# Patient Record
Sex: FEMALE | Race: WHITE | NOT HISPANIC OR LATINO
[De-identification: names, ages, dates, MRNs, and addresses within clinical notes are randomized per-mention and may not be internally consistent; named-entity substitution may affect disease eponyms.]

---

## 2019-12-11 PROBLEM — Z00.00 ENCOUNTER FOR PREVENTIVE HEALTH EXAMINATION: Status: ACTIVE | Noted: 2019-12-11

## 2019-12-13 ENCOUNTER — RESULT REVIEW (OUTPATIENT)
Age: 54
End: 2019-12-13

## 2019-12-13 ENCOUNTER — RECORD ABSTRACTING (OUTPATIENT)
Age: 54
End: 2019-12-13

## 2019-12-13 DIAGNOSIS — Z80.0 FAMILY HISTORY OF MALIGNANT NEOPLASM OF DIGESTIVE ORGANS: ICD-10-CM

## 2019-12-13 DIAGNOSIS — Z80.7 FAMILY HISTORY OF OTHER MALIGNANT NEOPLASMS OF LYMPHOID, HEMATOPOIETIC AND RELATED TISSUES: ICD-10-CM

## 2019-12-13 DIAGNOSIS — Z80.3 FAMILY HISTORY OF MALIGNANT NEOPLASM OF BREAST: ICD-10-CM

## 2019-12-13 LAB — CYTOLOGY CVX/VAG DOC THIN PREP: NORMAL

## 2019-12-17 ENCOUNTER — APPOINTMENT (OUTPATIENT)
Dept: OBGYN | Facility: CLINIC | Age: 54
End: 2019-12-17
Payer: COMMERCIAL

## 2019-12-17 VITALS
DIASTOLIC BLOOD PRESSURE: 76 MMHG | HEIGHT: 61 IN | BODY MASS INDEX: 28.32 KG/M2 | WEIGHT: 150 LBS | SYSTOLIC BLOOD PRESSURE: 114 MMHG

## 2019-12-17 DIAGNOSIS — Z12.31 ENCOUNTER FOR SCREENING MAMMOGRAM FOR MALIGNANT NEOPLASM OF BREAST: ICD-10-CM

## 2019-12-17 DIAGNOSIS — N95.0 POSTMENOPAUSAL BLEEDING: ICD-10-CM

## 2019-12-17 PROCEDURE — 99212 OFFICE O/P EST SF 10 MIN: CPT

## 2019-12-19 PROBLEM — N95.0 PMB (POSTMENOPAUSAL BLEEDING): Status: ACTIVE | Noted: 2019-12-11

## 2021-03-07 ENCOUNTER — TRANSCRIPTION ENCOUNTER (OUTPATIENT)
Age: 56
End: 2021-03-07

## 2021-04-12 ENCOUNTER — NON-APPOINTMENT (OUTPATIENT)
Age: 56
End: 2021-04-12

## 2021-04-12 ENCOUNTER — APPOINTMENT (OUTPATIENT)
Dept: INTERNAL MEDICINE | Facility: CLINIC | Age: 56
End: 2021-04-12
Payer: COMMERCIAL

## 2021-04-12 VITALS
WEIGHT: 161 LBS | OXYGEN SATURATION: 99 % | DIASTOLIC BLOOD PRESSURE: 80 MMHG | HEART RATE: 70 BPM | BODY MASS INDEX: 30.4 KG/M2 | HEIGHT: 61 IN | SYSTOLIC BLOOD PRESSURE: 118 MMHG | TEMPERATURE: 98.2 F

## 2021-04-12 DIAGNOSIS — Z76.89 PERSONS ENCOUNTERING HEALTH SERVICES IN OTHER SPECIFIED CIRCUMSTANCES: ICD-10-CM

## 2021-04-12 DIAGNOSIS — Z12.39 ENCOUNTER FOR OTHER SCREENING FOR MALIGNANT NEOPLASM OF BREAST: ICD-10-CM

## 2021-04-12 PROCEDURE — 99072 ADDL SUPL MATRL&STAF TM PHE: CPT

## 2021-04-12 PROCEDURE — 99203 OFFICE O/P NEW LOW 30 MIN: CPT | Mod: 25

## 2021-04-12 PROCEDURE — G0444 DEPRESSION SCREEN ANNUAL: CPT | Mod: 59

## 2021-04-12 NOTE — HEALTH RISK ASSESSMENT
[Very Good] : ~his/her~  mood as very good [0-5] : 0-5 [Yes] : Yes [2 - 3 times a week (3 pts)] : 2 - 3  times a week (3 points) [1 or 2 (0 pts)] : 1 or 2 (0 points) [Never (0 pts)] : Never (0 points) [No falls in past year] : Patient reported no falls in the past year [0] : 2) Feeling down, depressed, or hopeless: Not at all (0) [Patient reported mammogram was normal] : Patient reported mammogram was normal [Patient reported colonoscopy was normal] : Patient reported colonoscopy was normal [] : No [Audit-CScore] : 3 [de-identified] : daily walks  [de-identified] : more veggies than meats but eats a lot of sweets  [[id number]] : 0 [MammogramDate] : 08/19 [MammogramComments] : could of had one more recent but don't recall when  [ColonoscopyDate] : 01/12 [ColonoscopyComments] : 9 years ago

## 2021-04-12 NOTE — PHYSICAL EXAM
[No Acute Distress] : no acute distress [No Respiratory Distress] : no respiratory distress  [No Accessory Muscle Use] : no accessory muscle use [Clear to Auscultation] : lungs were clear to auscultation bilaterally [Normal Rate] : normal rate  [Regular Rhythm] : with a regular rhythm [Normal S1, S2] : normal S1 and S2 [No Murmur] : no murmur heard [No Carotid Bruits] : no carotid bruits [Pedal Pulses Present] : the pedal pulses are present [No Edema] : there was no peripheral edema [Normal Appearance] : normal in appearance [No Masses] : no palpable masses [No Nipple Discharge] : no nipple discharge [No Axillary Lymphadenopathy] : no axillary lymphadenopathy [Soft] : abdomen soft [Non Tender] : non-tender [Normal Bowel Sounds] : normal bowel sounds

## 2021-04-12 NOTE — HISTORY OF PRESENT ILLNESS
[FreeTextEntry1] : New Patient/here to establish Medical Care [de-identified] : 54 y/o here to establish Medical care.  She was followed by a Family Medicine Doctor and has decided to switch Doctors as her Children are not longer being followed by the Family Medicine Doctor.\par She reports a left side headache 1 month ago.  She had pain in Jaw with chewing.  She was seen by Dentist and Neurologist. MRI of head Pending.\par She needs a Mammogram.\par She will schedule an appointment for a her Annual Physical .\par

## 2021-04-12 NOTE — REVIEW OF SYSTEMS
[Headache] : headache [Fever] : no fever [Chills] : no chills [Chest Pain] : no chest pain [Palpitations] : no palpitations [Lower Ext Edema] : no lower extremity edema [Shortness Of Breath] : no shortness of breath [Cough] : no cough [Dizziness] : no dizziness [FreeTextEntry3] : last eye exam this year [de-identified] : has pain with chewing; question of Trigeminal Neuralgia

## 2021-05-26 ENCOUNTER — NON-APPOINTMENT (OUTPATIENT)
Age: 56
End: 2021-05-26

## 2021-05-26 ENCOUNTER — APPOINTMENT (OUTPATIENT)
Dept: INTERNAL MEDICINE | Facility: CLINIC | Age: 56
End: 2021-05-26
Payer: COMMERCIAL

## 2021-05-26 VITALS
HEIGHT: 61 IN | OXYGEN SATURATION: 99 % | BODY MASS INDEX: 29.45 KG/M2 | TEMPERATURE: 97.7 F | SYSTOLIC BLOOD PRESSURE: 116 MMHG | HEART RATE: 66 BPM | WEIGHT: 156 LBS | DIASTOLIC BLOOD PRESSURE: 80 MMHG

## 2021-05-26 DIAGNOSIS — E78.00 PURE HYPERCHOLESTEROLEMIA, UNSPECIFIED: ICD-10-CM

## 2021-05-26 DIAGNOSIS — G89.29 HEADACHE, UNSPECIFIED: ICD-10-CM

## 2021-05-26 DIAGNOSIS — R51.9 HEADACHE, UNSPECIFIED: ICD-10-CM

## 2021-05-26 DIAGNOSIS — Z12.11 ENCOUNTER FOR SCREENING FOR MALIGNANT NEOPLASM OF COLON: ICD-10-CM

## 2021-05-26 DIAGNOSIS — Z00.00 ENCOUNTER FOR GENERAL ADULT MEDICAL EXAMINATION W/OUT ABNORMAL FINDINGS: ICD-10-CM

## 2021-05-26 DIAGNOSIS — G93.9 DISORDER OF BRAIN, UNSPECIFIED: ICD-10-CM

## 2021-05-26 DIAGNOSIS — D22.9 MELANOCYTIC NEVI, UNSPECIFIED: ICD-10-CM

## 2021-05-26 PROCEDURE — 99072 ADDL SUPL MATRL&STAF TM PHE: CPT

## 2021-05-26 PROCEDURE — 93000 ELECTROCARDIOGRAM COMPLETE: CPT

## 2021-05-26 PROCEDURE — 99396 PREV VISIT EST AGE 40-64: CPT | Mod: 25

## 2021-05-26 PROCEDURE — 36415 COLL VENOUS BLD VENIPUNCTURE: CPT

## 2021-05-27 ENCOUNTER — NON-APPOINTMENT (OUTPATIENT)
Age: 56
End: 2021-05-27

## 2021-05-27 PROBLEM — E78.00 HYPERCHOLESTEROLEMIA: Status: ACTIVE | Noted: 2019-12-13

## 2021-05-27 PROBLEM — R51.9 CHRONIC LEFT-SIDED HEADACHES: Status: ACTIVE | Noted: 2021-05-27

## 2021-05-27 PROBLEM — G93.9 BRAIN LESION: Status: ACTIVE | Noted: 2021-05-27

## 2021-05-27 PROBLEM — Z00.00 ANNUAL PHYSICAL EXAM: Status: ACTIVE | Noted: 2021-05-26

## 2021-05-27 NOTE — HEALTH RISK ASSESSMENT
[Very Good] : ~his/her~  mood as very good [Yes] : Yes [2 - 3 times a week (3 pts)] : 2 - 3  times a week (3 points) [1 or 2 (0 pts)] : 1 or 2 (0 points) [Never (0 pts)] : Never (0 points) [No] : In the past 12 months have you used drugs other than those required for medical reasons? No [No falls in past year] : Patient reported no falls in the past year [0] : 2) Feeling down, depressed, or hopeless: Not at all (0) [Patient reported mammogram was normal] : Patient reported mammogram was normal [Patient reported PAP Smear was normal] : Patient reported PAP Smear was normal [HIV test declined] : HIV test declined [Hepatitis C test declined] : Hepatitis C test declined [] : No [de-identified] : neurologist May 2021 [Audit-CScore] : 3 [de-identified] : daily walks a couple miles a day  [de-identified] : normal diet  [[id number]] : 0 [MammogramDate] : 05/21 [PapSmearDate] : 08/18 [ColonoscopyDate] : 08/09

## 2021-05-27 NOTE — HISTORY OF PRESENT ILLNESS
[FreeTextEntry1] : Annual Physical [de-identified] : Patient is a 55 year-old lady here for her annual physical.  She has a history of hyperlipidemia and chronic left-sided headache.  Her Neurologist referred her to a Neurosurgeon. MRI with a 1.2 x 0.7 x 2.3 cm lesion in the prepontine fossa. She continues to have intermittent headaches.\par Mammogram negative.  She will schedule appointment with her GYN MD.

## 2021-05-27 NOTE — PHYSICAL EXAM
[No Acute Distress] : no acute distress [Well-Appearing] : well-appearing [Normal Sclera/Conjunctiva] : normal sclera/conjunctiva [EOMI] : extraocular movements intact [Normal Outer Ear/Nose] : the outer ears and nose were normal in appearance [No JVD] : no jugular venous distention [Supple] : supple [No Lymphadenopathy] : no lymphadenopathy [No Respiratory Distress] : no respiratory distress  [No Accessory Muscle Use] : no accessory muscle use [Clear to Auscultation] : lungs were clear to auscultation bilaterally [Normal Rate] : normal rate  [Regular Rhythm] : with a regular rhythm [Normal S1, S2] : normal S1 and S2 [No Murmur] : no murmur heard [No Carotid Bruits] : no carotid bruits [Pedal Pulses Present] : the pedal pulses are present [No Edema] : there was no peripheral edema [Soft] : abdomen soft [Non Tender] : non-tender [Normal Bowel Sounds] : normal bowel sounds [Normal Supraclavicular Nodes] : no supraclavicular lymphadenopathy [Normal Axillary Nodes] : no axillary lymphadenopathy [Normal Posterior Cervical Nodes] : no posterior cervical lymphadenopathy [Normal Anterior Cervical Nodes] : no anterior cervical lymphadenopathy [No CVA Tenderness] : no CVA  tenderness [No Spinal Tenderness] : no spinal tenderness [No Joint Swelling] : no joint swelling [Grossly Normal Strength/Tone] : grossly normal strength/tone [No Rash] : no rash [Coordination Grossly Intact] : coordination grossly intact [No Focal Deficits] : no focal deficits [Normal Gait] : normal gait [Deep Tendon Reflexes (DTR)] : deep tendon reflexes were 2+ and symmetric [Speech Grossly Normal] : speech grossly normal [Normal Affect] : the affect was normal [Alert and Oriented x3] : oriented to person, place, and time [Normal Mood] : the mood was normal

## 2021-05-27 NOTE — REVIEW OF SYSTEMS
[Headache] : headache [Fever] : no fever [Chills] : no chills [Fatigue] : no fatigue [Sore Throat] : no sore throat [Postnasal Drip] : no postnasal drip [Chest Pain] : no chest pain [Palpitations] : no palpitations [Lower Ext Edema] : no lower extremity edema [Shortness Of Breath] : no shortness of breath [Cough] : no cough [Abdominal Pain] : no abdominal pain [Nausea] : no nausea [Constipation] : no constipation [Diarrhea] : diarrhea [Vomiting] : no vomiting [Melena] : no melena [Dysuria] : no dysuria [Vaginal Discharge] : no vaginal discharge [Joint Pain] : no joint pain [Back Pain] : no back pain [Dizziness] : no dizziness [Suicidal] : not suicidal [Depression] : no depression [Easy Bleeding] : no easy bleeding [Swollen Glands] : no swollen glands [FreeTextEntry3] : last eye exam this year [de-identified] : left sided; MRI with a prepontine fossa lesion

## 2021-06-08 ENCOUNTER — APPOINTMENT (OUTPATIENT)
Dept: NEUROSURGERY | Facility: CLINIC | Age: 56
End: 2021-06-08
Payer: COMMERCIAL

## 2021-06-08 ENCOUNTER — TRANSCRIPTION ENCOUNTER (OUTPATIENT)
Age: 56
End: 2021-06-08

## 2021-06-08 PROCEDURE — 99205 OFFICE O/P NEW HI 60 MIN: CPT

## 2021-06-08 PROCEDURE — 99072 ADDL SUPL MATRL&STAF TM PHE: CPT

## 2021-06-08 NOTE — PHYSICAL EXAM
[General Appearance - Alert] : alert [General Appearance - In No Acute Distress] : in no acute distress [General Appearance - Well Nourished] : well nourished [General Appearance - Well Developed] : well developed [Oriented To Time, Place, And Person] : oriented to person, place, and time [Cranial Nerves Optic (II)] : visual acuity intact bilaterally,  pupils equal round and reactive to light [Cranial Nerves Oculomotor (III)] : extraocular motion intact [Cranial Nerves Trigeminal (V)] : facial sensation intact symmetrically [Cranial Nerves Facial (VII)] : face symmetrical [Cranial Nerves Vestibulocochlear (VIII)] : hearing was intact bilaterally [Cranial Nerves Glossopharyngeal (IX)] : tongue and palate midline [Cranial Nerves Accessory (XI - Cranial And Spinal)] : head turning and shoulder shrug symmetric [Motor Tone] : muscle tone was normal in all four extremities [Cranial Nerves Hypoglossal (XII)] : there was no tongue deviation with protrusion [Motor Strength] : muscle strength was normal in all four extremities [Sensation Tactile Decrease] : light touch was intact [Abnormal Walk] : normal gait [Balance] : balance was intact

## 2021-06-08 NOTE — HISTORY OF PRESENT ILLNESS
[FreeTextEntry1] : left facial pain [de-identified] : 55 year old female with a past medical history of hyperlipidemia presents in neurosurgical consultation at the request of Dr. Yesenia Lemons. She endorses a history of two paroxysmal episodes of left sided facial and head pain.  Described as pulsating, around the left temple and left mandibular region.  Dental evaluation was normal, and she was subsequently referred to Dr. Lemons.  An MRI was performed which revealed a non-enhancing prepontine cystic mass lesion, discussed below.\par \par Presently, Ms. Carlton denies any residual headache or facial pain. Denies extremity numbness/weakness.  Denies visual disturbances.

## 2021-06-08 NOTE — ASSESSMENT
[FreeTextEntry1] : 55yf with history of episodic atypical left sided facial pain and imaging demonstrating a nonenhancing cystic lesion in the prepontine cistern eccentric to the right. There does not appear to be anatomic proximity to the left trigeminal nerve, and therefore I feel this lesion in incidental and not etiologic for her atypical facial pain. There is no change in size or morphology over a one month time span and the patient is currently asymptomatic. 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 Neurosurgical intervention is not recommended at this time. I plan to obtain a CT with thin cuts through the skull base to evaluate the relationship of the cyst to the clivus and to serve as a baseline for possible future imaging. I plan to repeat the MRI brain with and without gadolinium in 6 months to ensure stability. I have asked the patient to contact my office for any symptomatic development or progression in the interim at which time we can obtain expedited follow up imaging.

## 2021-06-08 NOTE — DATA REVIEWED
[de-identified] : 4/28/21 (without stephy), 5/24/21 (with and without stephy) reviewed independently by me demonstrate a non-enhancing cystic lesion in the prepontine cistern most consistent with arachnoid cyst versus ecchordosis physaliphora\par 4/28/21: Approximately 1 cm cystic lesion seen in the right prepontine cistern that may \par represent a small arachnoid cyst; if there is further clinical concern would recommend MRI with \par contrast. \par \par  Normal course of the lateral nerve VII and VIII complexes. Normal course of the bilateral \par trigeminal nerves. \par \par  Mild to moderate cerebellar atrophy; findings may be due to epilepsy medication or alcohol use. \par \par  Nonspecific single small ovoid foci of T2 and Flair signal hyperintensity within the left anterior \par external capsule ; findings may be seen in patient with migraine headaches, vasculitis, \par demyelinating disease, Lyme disease and viral illness. No evidence of acute infarction. \par 5/24/21: No abnormal enhancement and benign imaging characteristics relating to the circumscribed lesion \par following CSF signal on all sequences within the prepontine cistern. Leading diagnostic consideratio\par ns include arachnoid cyst or Ecchordosis physaliphora, stable in size and morphology between the 2 scans.

## 2021-06-11 ENCOUNTER — APPOINTMENT (OUTPATIENT)
Dept: INTERNAL MEDICINE | Facility: CLINIC | Age: 56
End: 2021-06-11

## 2021-07-07 LAB
ALBUMIN SERPL ELPH-MCNC: 4.5 G/DL
ALP BLD-CCNC: 84 U/L
ALT SERPL-CCNC: 22 U/L
ANION GAP SERPL CALC-SCNC: 12 MMOL/L
APPEARANCE: CLEAR
AST SERPL-CCNC: 28 U/L
BACTERIA: NEGATIVE
BASOPHILS # BLD AUTO: 0.02 K/UL
BASOPHILS NFR BLD AUTO: 0.5 %
BILIRUB SERPL-MCNC: 0.5 MG/DL
BILIRUBIN URINE: NEGATIVE
BLOOD URINE: ABNORMAL
BUN SERPL-MCNC: 11 MG/DL
CALCIUM SERPL-MCNC: 9.9 MG/DL
CHLORIDE SERPL-SCNC: 101 MMOL/L
CHOLEST SERPL-MCNC: 243 MG/DL
CO2 SERPL-SCNC: 27 MMOL/L
COLOR: YELLOW
CREAT SERPL-MCNC: 0.66 MG/DL
EOSINOPHIL # BLD AUTO: 0.17 K/UL
EOSINOPHIL NFR BLD AUTO: 3.9 %
GLUCOSE QUALITATIVE U: NEGATIVE
GLUCOSE SERPL-MCNC: 88 MG/DL
HCT VFR BLD CALC: 40.9 %
HDLC SERPL-MCNC: 102 MG/DL
HGB BLD-MCNC: 12.8 G/DL
HYALINE CASTS: 1 /LPF
IMM GRANULOCYTES NFR BLD AUTO: 0.2 %
KETONES URINE: NEGATIVE
LDLC SERPL CALC-MCNC: 131 MG/DL
LEUKOCYTE ESTERASE URINE: NEGATIVE
LYMPHOCYTES # BLD AUTO: 1.69 K/UL
LYMPHOCYTES NFR BLD AUTO: 38.9 %
MAN DIFF?: NORMAL
MCHC RBC-ENTMCNC: 29.1 PG
MCHC RBC-ENTMCNC: 31.3 GM/DL
MCV RBC AUTO: 93 FL
MICROSCOPIC-UA: NORMAL
MONOCYTES # BLD AUTO: 0.34 K/UL
MONOCYTES NFR BLD AUTO: 7.8 %
NEUTROPHILS # BLD AUTO: 2.11 K/UL
NEUTROPHILS NFR BLD AUTO: 48.7 %
NITRITE URINE: NEGATIVE
NONHDLC SERPL-MCNC: 141 MG/DL
PH URINE: 5.5
PLATELET # BLD AUTO: 279 K/UL
POTASSIUM SERPL-SCNC: 4.3 MMOL/L
PROT SERPL-MCNC: 7.1 G/DL
PROTEIN URINE: NEGATIVE
RBC # BLD: 4.4 M/UL
RBC # FLD: 12.2 %
RED BLOOD CELLS URINE: 5 /HPF
SODIUM SERPL-SCNC: 140 MMOL/L
SPECIFIC GRAVITY URINE: 1.02
SQUAMOUS EPITHELIAL CELLS: 1 /HPF
TRIGL SERPL-MCNC: 50 MG/DL
TSH SERPL-ACNC: 1.08 UIU/ML
UROBILINOGEN URINE: NORMAL
WBC # FLD AUTO: 4.34 K/UL
WHITE BLOOD CELLS URINE: 0 /HPF

## 2021-08-24 ENCOUNTER — TRANSCRIPTION ENCOUNTER (OUTPATIENT)
Age: 56
End: 2021-08-24

## 2021-12-21 ENCOUNTER — APPOINTMENT (OUTPATIENT)
Dept: NEUROSURGERY | Facility: CLINIC | Age: 56
End: 2021-12-21

## 2022-02-11 ENCOUNTER — TRANSCRIPTION ENCOUNTER (OUTPATIENT)
Age: 57
End: 2022-02-11

## 2022-03-22 ENCOUNTER — TRANSCRIPTION ENCOUNTER (OUTPATIENT)
Age: 57
End: 2022-03-22

## 2023-05-22 ENCOUNTER — LABORATORY RESULT (OUTPATIENT)
Age: 58
End: 2023-05-22

## 2023-05-22 ENCOUNTER — APPOINTMENT (OUTPATIENT)
Dept: INTERNAL MEDICINE | Facility: CLINIC | Age: 58
End: 2023-05-22
Payer: COMMERCIAL

## 2023-05-22 VITALS
HEIGHT: 61 IN | SYSTOLIC BLOOD PRESSURE: 110 MMHG | OXYGEN SATURATION: 100 % | DIASTOLIC BLOOD PRESSURE: 70 MMHG | BODY MASS INDEX: 30.4 KG/M2 | HEART RATE: 65 BPM | WEIGHT: 161 LBS

## 2023-05-22 PROCEDURE — 99214 OFFICE O/P EST MOD 30 MIN: CPT

## 2023-06-05 LAB
B BURGDOR DNA SPEC QL NAA+PROBE: NEGATIVE
ERYTHROCYTE [SEDIMENTATION RATE] IN BLOOD BY WESTERGREN METHOD: 28 MM/HR
T4 FREE SERPL-MCNC: 1.1 NG/DL
TSH SERPL-ACNC: 1.31 UIU/ML

## 2023-06-10 ENCOUNTER — APPOINTMENT (OUTPATIENT)
Dept: INTERNAL MEDICINE | Facility: CLINIC | Age: 58
End: 2023-06-10

## 2023-06-16 ENCOUNTER — TRANSCRIPTION ENCOUNTER (OUTPATIENT)
Age: 58
End: 2023-06-16

## 2023-06-19 ENCOUNTER — TRANSCRIPTION ENCOUNTER (OUTPATIENT)
Age: 58
End: 2023-06-19

## 2023-06-20 ENCOUNTER — APPOINTMENT (OUTPATIENT)
Dept: INTERNAL MEDICINE | Facility: CLINIC | Age: 58
End: 2023-06-20
Payer: COMMERCIAL

## 2023-06-20 VITALS
DIASTOLIC BLOOD PRESSURE: 74 MMHG | SYSTOLIC BLOOD PRESSURE: 110 MMHG | OXYGEN SATURATION: 98 % | BODY MASS INDEX: 30.4 KG/M2 | HEIGHT: 61 IN | TEMPERATURE: 97.3 F | RESPIRATION RATE: 16 BRPM | HEART RATE: 78 BPM | WEIGHT: 161 LBS

## 2023-06-20 DIAGNOSIS — R29.898 OTHER SYMPTOMS AND SIGNS INVOLVING THE MUSCULOSKELETAL SYSTEM: ICD-10-CM

## 2023-06-20 PROCEDURE — 36415 COLL VENOUS BLD VENIPUNCTURE: CPT

## 2023-06-20 PROCEDURE — 99214 OFFICE O/P EST MOD 30 MIN: CPT | Mod: 25

## 2023-06-21 ENCOUNTER — APPOINTMENT (OUTPATIENT)
Dept: RHEUMATOLOGY | Facility: CLINIC | Age: 58
End: 2023-06-21
Payer: COMMERCIAL

## 2023-06-21 VITALS
BODY MASS INDEX: 30.4 KG/M2 | HEIGHT: 61 IN | OXYGEN SATURATION: 98 % | WEIGHT: 161 LBS | HEART RATE: 78 BPM | SYSTOLIC BLOOD PRESSURE: 110 MMHG | DIASTOLIC BLOOD PRESSURE: 80 MMHG

## 2023-06-21 DIAGNOSIS — M54.2 CERVICALGIA: ICD-10-CM

## 2023-06-21 PROBLEM — R29.898 WEAKNESS OF DISTAL ARMS AND LEGS: Status: ACTIVE | Noted: 2023-05-22

## 2023-06-21 PROCEDURE — 99205 OFFICE O/P NEW HI 60 MIN: CPT

## 2023-06-21 NOTE — PHYSICAL EXAM
[General Appearance - Alert] : alert [General Appearance - In No Acute Distress] : in no acute distress [General Appearance - Well Nourished] : well nourished [General Appearance - Well Developed] : well developed [Sclera] : the sclera and conjunctiva were normal [Auscultation Breath Sounds / Voice Sounds] : lungs were clear to auscultation bilaterally [Cervical Lymph Nodes Enlarged Posterior Bilaterally] : posterior cervical [Cervical Lymph Nodes Enlarged Anterior Bilaterally] : anterior cervical [] : no rash [Motor Exam] : the motor exam was normal [FreeTextEntry1] : There is minimal discomfort on palpation of GH joints bilaterally. No proximal weakness. There is reduced ROM of the hips without pain on extreme ROM. There is no other active synovitis throughout all other joints examined.

## 2023-06-21 NOTE — REVIEW OF SYSTEMS
[Fever] : no fever [Chills] : no chills [Eye Pain] : no eye pain [Red Eyes] : eyes not red [Shortness Of Breath] : no shortness of breath [SOB on Exertion] : no shortness of breath during exertion [Abdominal Pain] : no abdominal pain [Diarrhea] : no diarrhea [Dysuria] : no dysuria [Joint Swelling] : no joint swelling [Skin Lesions] : no skin lesions [FreeTextEntry6] : No pleuritic C/P

## 2023-06-21 NOTE — HISTORY OF PRESENT ILLNESS
[FreeTextEntry1] : Patient reports having developed progressive shoulder girdle pain and stiffness in late April with iu=inability to raise her arms above her head. Then later developed hip girdle pain and stiffness. Was seen by PCP who found a rising ESR and was sent for evaluation. There has been no weight loss, fever, HAs or claudication. there has been no rash or skin photosensitivity, serositis and no ocular, GI or  symptoms. There have been no small joint symptoms in the hands or feet. There is profound fatigue closely associated with joint symptoms.

## 2023-06-22 DIAGNOSIS — R76.8 OTHER SPECIFIED ABNORMAL IMMUNOLOGICAL FINDINGS IN SERUM: ICD-10-CM

## 2023-06-22 LAB
CCP AB SER IA-ACNC: 66 UNITS
RF+CCP IGG SER-IMP: ABNORMAL

## 2023-06-23 LAB
ENA SS-A AB SER IA-ACNC: 0.5 AL
ENA SS-B AB SER IA-ACNC: <0.2 AL

## 2023-06-26 LAB
ALBUMIN SERPL ELPH-MCNC: 4.5 G/DL
ALP BLD-CCNC: 109 U/L
ALT SERPL-CCNC: 30 U/L
ANA SER IF-ACNC: NEGATIVE
ANION GAP SERPL CALC-SCNC: 16 MMOL/L
AST SERPL-CCNC: 37 U/L
BILIRUB SERPL-MCNC: 0.4 MG/DL
BUN SERPL-MCNC: 15 MG/DL
CALCIUM SERPL-MCNC: 9.5 MG/DL
CHLORIDE SERPL-SCNC: 102 MMOL/L
CK SERPL-CCNC: 85 U/L
CO2 SERPL-SCNC: 24 MMOL/L
CREAT SERPL-MCNC: 0.54 MG/DL
CRP SERPL-MCNC: 4 MG/L
DSDNA AB SER-ACNC: <12 IU/ML
EGFR: 108 ML/MIN/1.73M2
ERYTHROCYTE [SEDIMENTATION RATE] IN BLOOD BY WESTERGREN METHOD: 64 MM/HR
GLUCOSE SERPL-MCNC: 112 MG/DL
POTASSIUM SERPL-SCNC: 4.1 MMOL/L
PROT SERPL-MCNC: 7.4 G/DL
RHEUMATOID FACT SER QL: 10 IU/ML
SODIUM SERPL-SCNC: 141 MMOL/L

## 2023-06-27 LAB — HISTONE AB SER QL: 0.8 UNITS

## 2023-06-28 ENCOUNTER — APPOINTMENT (OUTPATIENT)
Dept: RHEUMATOLOGY | Facility: CLINIC | Age: 58
End: 2023-06-28
Payer: COMMERCIAL

## 2023-06-28 VITALS
HEART RATE: 71 BPM | SYSTOLIC BLOOD PRESSURE: 120 MMHG | WEIGHT: 161 LBS | HEIGHT: 61 IN | OXYGEN SATURATION: 98 % | DIASTOLIC BLOOD PRESSURE: 70 MMHG | BODY MASS INDEX: 30.4 KG/M2

## 2023-06-28 DIAGNOSIS — Z79.899 OTHER LONG TERM (CURRENT) DRUG THERAPY: ICD-10-CM

## 2023-06-28 PROCEDURE — 99215 OFFICE O/P EST HI 40 MIN: CPT

## 2023-06-28 NOTE — REVIEW OF SYSTEMS
[Joint Pain] : joint pain [Joint Stiffness] : joint stiffness [Fever] : no fever [Chills] : no chills [Eye Pain] : no eye pain [Red Eyes] : eyes not red [Shortness Of Breath] : no shortness of breath [SOB on Exertion] : no shortness of breath during exertion [Abdominal Pain] : no abdominal pain [Diarrhea] : no diarrhea [Dysuria] : no dysuria [Joint Swelling] : no joint swelling [Skin Lesions] : no skin lesions [FreeTextEntry6] : No pleuritic C/P

## 2023-06-28 NOTE — PHYSICAL EXAM
[General Appearance - Alert] : alert [General Appearance - In No Acute Distress] : in no acute distress [General Appearance - Well Nourished] : well nourished [General Appearance - Well Developed] : well developed [] : no rash [Motor Exam] : the motor exam was normal [FreeTextEntry1] : There is no other active synovitis throughout all joints examined.

## 2023-06-28 NOTE — HISTORY OF PRESENT ILLNESS
[FreeTextEntry1] : Patient sustained only partial benefit with prednisone 10 mg - VAS reduced from 10/10 to 5/10.

## 2023-06-29 LAB
HBV SURFACE AG SER QL: NONREACTIVE
HCV AB SER QL: NONREACTIVE
HCV S/CO RATIO: 0.1 S/CO

## 2023-07-03 LAB
M TB IFN-G BLD-IMP: NEGATIVE
QUANTIFERON TB PLUS MITOGEN MINUS NIL: 1.4 IU/ML
QUANTIFERON TB PLUS NIL: 0.02 IU/ML
QUANTIFERON TB PLUS TB1 MINUS NIL: -0.01 IU/ML
QUANTIFERON TB PLUS TB2 MINUS NIL: -0.01 IU/ML

## 2023-07-28 ENCOUNTER — APPOINTMENT (OUTPATIENT)
Dept: INTERNAL MEDICINE | Facility: CLINIC | Age: 58
End: 2023-07-28
Payer: COMMERCIAL

## 2023-07-28 PROCEDURE — 36415 COLL VENOUS BLD VENIPUNCTURE: CPT

## 2023-08-28 ENCOUNTER — LABORATORY RESULT (OUTPATIENT)
Age: 58
End: 2023-08-28

## 2023-08-28 ENCOUNTER — APPOINTMENT (OUTPATIENT)
Dept: RHEUMATOLOGY | Facility: CLINIC | Age: 58
End: 2023-08-28
Payer: COMMERCIAL

## 2023-08-28 PROCEDURE — 36415 COLL VENOUS BLD VENIPUNCTURE: CPT

## 2023-09-01 ENCOUNTER — APPOINTMENT (OUTPATIENT)
Dept: INTERNAL MEDICINE | Facility: CLINIC | Age: 58
End: 2023-09-01

## 2023-09-12 ENCOUNTER — APPOINTMENT (OUTPATIENT)
Dept: RHEUMATOLOGY | Facility: CLINIC | Age: 58
End: 2023-09-12
Payer: COMMERCIAL

## 2023-09-12 VITALS
HEART RATE: 70 BPM | DIASTOLIC BLOOD PRESSURE: 70 MMHG | BODY MASS INDEX: 31.41 KG/M2 | SYSTOLIC BLOOD PRESSURE: 110 MMHG | WEIGHT: 160 LBS | OXYGEN SATURATION: 97 % | HEIGHT: 60 IN

## 2023-09-12 PROCEDURE — 99215 OFFICE O/P EST HI 40 MIN: CPT

## 2023-09-12 RX ORDER — LEFLUNOMIDE 20 MG/1
20 TABLET, FILM COATED ORAL
Qty: 90 | Refills: 1 | Status: DISCONTINUED | COMMUNITY
Start: 2023-06-28 | End: 2023-09-12

## 2023-09-13 LAB
ALBUMIN SERPL ELPH-MCNC: 4.8 G/DL
ALP BLD-CCNC: 78 U/L
ALT SERPL-CCNC: 22 U/L
AST SERPL-CCNC: 26 U/L
BILIRUB DIRECT SERPL-MCNC: 0.1 MG/DL
BILIRUB INDIRECT SERPL-MCNC: 0.2 MG/DL
BILIRUB SERPL-MCNC: 0.4 MG/DL
PROT SERPL-MCNC: 7.5 G/DL

## 2023-10-02 ENCOUNTER — APPOINTMENT (OUTPATIENT)
Dept: RHEUMATOLOGY | Facility: CLINIC | Age: 58
End: 2023-10-02

## 2023-11-11 ENCOUNTER — RX RENEWAL (OUTPATIENT)
Age: 58
End: 2023-11-11

## 2023-11-27 ENCOUNTER — RX RENEWAL (OUTPATIENT)
Age: 58
End: 2023-11-27

## 2023-11-27 RX ORDER — PREDNISONE 5 MG/1
5 TABLET ORAL DAILY
Qty: 30 | Refills: 1 | Status: ACTIVE | COMMUNITY
Start: 2023-08-31 | End: 1900-01-01

## 2023-12-11 ENCOUNTER — APPOINTMENT (OUTPATIENT)
Dept: RHEUMATOLOGY | Facility: CLINIC | Age: 58
End: 2023-12-11
Payer: COMMERCIAL

## 2023-12-11 VITALS
DIASTOLIC BLOOD PRESSURE: 78 MMHG | BODY MASS INDEX: 31.41 KG/M2 | HEIGHT: 60 IN | WEIGHT: 160 LBS | OXYGEN SATURATION: 98 % | HEART RATE: 76 BPM | SYSTOLIC BLOOD PRESSURE: 122 MMHG

## 2023-12-11 PROCEDURE — 99214 OFFICE O/P EST MOD 30 MIN: CPT

## 2023-12-11 RX ORDER — PREDNISONE 1 MG/1
1 TABLET ORAL
Qty: 100 | Refills: 0 | Status: DISCONTINUED | COMMUNITY
Start: 2023-06-21 | End: 2023-12-11

## 2024-03-05 ENCOUNTER — RX RENEWAL (OUTPATIENT)
Age: 59
End: 2024-03-05

## 2024-05-23 ENCOUNTER — APPOINTMENT (OUTPATIENT)
Dept: RHEUMATOLOGY | Facility: CLINIC | Age: 59
End: 2024-05-23
Payer: COMMERCIAL

## 2024-05-23 VITALS
BODY MASS INDEX: 30.63 KG/M2 | OXYGEN SATURATION: 98 % | DIASTOLIC BLOOD PRESSURE: 64 MMHG | WEIGHT: 156 LBS | SYSTOLIC BLOOD PRESSURE: 112 MMHG | HEIGHT: 60 IN | HEART RATE: 77 BPM

## 2024-05-23 DIAGNOSIS — M06.09 RHEUMATOID ARTHRITIS W/OUT RHEUMATOID FACTOR, MULTIPLE SITES: ICD-10-CM

## 2024-05-23 PROCEDURE — G2211 COMPLEX E/M VISIT ADD ON: CPT

## 2024-05-23 PROCEDURE — 99214 OFFICE O/P EST MOD 30 MIN: CPT

## 2024-05-23 RX ORDER — HYDROXYCHLOROQUINE SULFATE 200 MG/1
200 TABLET, FILM COATED ORAL
Qty: 270 | Refills: 1 | Status: ACTIVE | COMMUNITY
Start: 2023-09-12 | End: 1900-01-01

## 2024-05-23 NOTE — PHYSICAL EXAM
[General Appearance - Alert] : alert [General Appearance - In No Acute Distress] : in no acute distress [General Appearance - Well Developed] : well developed [General Appearance - Well Nourished] : well nourished [] : no rash [Motor Exam] : the motor exam was normal [FreeTextEntry1] : There is no active synovitis throughout all joints examined.

## 2024-05-23 NOTE — HISTORY OF PRESENT ILLNESS
[FreeTextEntry1] : Patient reports was able to taper off prednisone in December without flare until about one month ago developed increased joint stiffness and increased fatigue. Taking Plaquenil as Rx'ed - has not yet seen ophtho (has had previous visits with ophtho).

## 2024-10-02 ENCOUNTER — APPOINTMENT (OUTPATIENT)
Dept: RHEUMATOLOGY | Facility: CLINIC | Age: 59
End: 2024-10-02
Payer: COMMERCIAL

## 2024-10-02 VITALS
SYSTOLIC BLOOD PRESSURE: 118 MMHG | OXYGEN SATURATION: 98 % | DIASTOLIC BLOOD PRESSURE: 70 MMHG | HEIGHT: 60 IN | HEART RATE: 73 BPM | WEIGHT: 157 LBS | BODY MASS INDEX: 30.82 KG/M2

## 2024-10-02 DIAGNOSIS — M06.09 RHEUMATOID ARTHRITIS W/OUT RHEUMATOID FACTOR, MULTIPLE SITES: ICD-10-CM

## 2024-10-02 PROCEDURE — 99214 OFFICE O/P EST MOD 30 MIN: CPT

## 2024-10-02 PROCEDURE — G2211 COMPLEX E/M VISIT ADD ON: CPT | Mod: NC

## 2024-10-02 RX ORDER — PREDNISONE 20 MG/1
20 TABLET ORAL
Qty: 5 | Refills: 0 | Status: COMPLETED | COMMUNITY
Start: 2024-10-02 | End: 2024-10-09

## 2024-10-02 NOTE — PHYSICAL EXAM
[General Appearance - Alert] : alert [General Appearance - In No Acute Distress] : in no acute distress [General Appearance - Well Nourished] : well nourished [General Appearance - Well Developed] : well developed [] : no rash [Motor Exam] : the motor exam was normal [FreeTextEntry1] : There is minimal pain on extreme ROM of the left hip.

## 2024-10-02 NOTE — END OF VISIT
[Time Spent: ___ minutes] : I have spent [unfilled] minutes of time on the encounter which excludes teaching and separately reported services.
Yes

## 2024-10-02 NOTE — HISTORY OF PRESENT ILLNESS
[FreeTextEntry1] : Patient reports she did feel better while on Plaquenil 600 mg daily, and after reducing the dose  she notes in the last month increased thigh pain and stiffness.

## 2024-10-25 ENCOUNTER — APPOINTMENT (OUTPATIENT)
Dept: RHEUMATOLOGY | Facility: CLINIC | Age: 59
End: 2024-10-25
Payer: COMMERCIAL

## 2024-10-25 VITALS
HEIGHT: 60 IN | SYSTOLIC BLOOD PRESSURE: 102 MMHG | DIASTOLIC BLOOD PRESSURE: 70 MMHG | WEIGHT: 157 LBS | BODY MASS INDEX: 30.82 KG/M2

## 2024-10-25 DIAGNOSIS — M06.09 RHEUMATOID ARTHRITIS W/OUT RHEUMATOID FACTOR, MULTIPLE SITES: ICD-10-CM

## 2024-10-25 PROCEDURE — G2211 COMPLEX E/M VISIT ADD ON: CPT | Mod: NC

## 2024-10-25 PROCEDURE — 99214 OFFICE O/P EST MOD 30 MIN: CPT

## 2024-10-25 RX ORDER — SULFASALAZINE 500 MG/1
500 TABLET, DELAYED RELEASE ORAL
Qty: 360 | Refills: 1 | Status: ACTIVE | COMMUNITY
Start: 2024-10-25 | End: 1900-01-01

## 2024-11-25 ENCOUNTER — APPOINTMENT (OUTPATIENT)
Dept: RHEUMATOLOGY | Facility: CLINIC | Age: 59
End: 2024-11-25
Payer: COMMERCIAL

## 2024-11-25 PROCEDURE — 36415 COLL VENOUS BLD VENIPUNCTURE: CPT

## 2024-11-26 LAB
ALBUMIN SERPL ELPH-MCNC: 4.3 G/DL
ALP BLD-CCNC: 98 U/L
ALT SERPL-CCNC: 36 U/L
ANION GAP SERPL CALC-SCNC: 12 MMOL/L
AST SERPL-CCNC: 36 U/L
BASOPHILS # BLD AUTO: 0.03 K/UL
BASOPHILS NFR BLD AUTO: 0.7 %
BILIRUB SERPL-MCNC: 0.3 MG/DL
BUN SERPL-MCNC: 10 MG/DL
CALCIUM SERPL-MCNC: 9.6 MG/DL
CHLORIDE SERPL-SCNC: 102 MMOL/L
CO2 SERPL-SCNC: 29 MMOL/L
CREAT SERPL-MCNC: 0.69 MG/DL
EGFR: 101 ML/MIN/1.73M2
EOSINOPHIL # BLD AUTO: 0.09 K/UL
EOSINOPHIL NFR BLD AUTO: 2.2 %
GLUCOSE SERPL-MCNC: 80 MG/DL
HCT VFR BLD CALC: 41.6 %
HGB BLD-MCNC: 13.2 G/DL
IMM GRANULOCYTES NFR BLD AUTO: 0 %
LYMPHOCYTES # BLD AUTO: 1.15 K/UL
LYMPHOCYTES NFR BLD AUTO: 28.3 %
MAN DIFF?: NORMAL
MCHC RBC-ENTMCNC: 29.9 PG
MCHC RBC-ENTMCNC: 31.7 G/DL
MCV RBC AUTO: 94.3 FL
MONOCYTES # BLD AUTO: 0.35 K/UL
MONOCYTES NFR BLD AUTO: 8.6 %
NEUTROPHILS # BLD AUTO: 2.44 K/UL
NEUTROPHILS NFR BLD AUTO: 60.2 %
PLATELET # BLD AUTO: 286 K/UL
POTASSIUM SERPL-SCNC: 4.3 MMOL/L
PROT SERPL-MCNC: 6.9 G/DL
RBC # BLD: 4.41 M/UL
RBC # FLD: 13.1 %
SODIUM SERPL-SCNC: 143 MMOL/L
WBC # FLD AUTO: 4.06 K/UL

## 2025-01-30 ENCOUNTER — APPOINTMENT (OUTPATIENT)
Dept: RHEUMATOLOGY | Facility: CLINIC | Age: 60
End: 2025-01-30
Payer: COMMERCIAL

## 2025-01-30 VITALS
HEIGHT: 60 IN | OXYGEN SATURATION: 98 % | HEART RATE: 75 BPM | BODY MASS INDEX: 30.43 KG/M2 | SYSTOLIC BLOOD PRESSURE: 100 MMHG | WEIGHT: 155 LBS | DIASTOLIC BLOOD PRESSURE: 70 MMHG

## 2025-01-30 DIAGNOSIS — M06.09 RHEUMATOID ARTHRITIS W/OUT RHEUMATOID FACTOR, MULTIPLE SITES: ICD-10-CM

## 2025-01-30 PROCEDURE — G2211 COMPLEX E/M VISIT ADD ON: CPT | Mod: NC

## 2025-01-30 PROCEDURE — 99214 OFFICE O/P EST MOD 30 MIN: CPT

## 2025-01-31 LAB
ALBUMIN SERPL ELPH-MCNC: 4.2 G/DL
ALP BLD-CCNC: 75 U/L
ALT SERPL-CCNC: 16 U/L
ANION GAP SERPL CALC-SCNC: 10 MMOL/L
AST SERPL-CCNC: 27 U/L
BASOPHILS # BLD AUTO: 0.03 K/UL
BASOPHILS NFR BLD AUTO: 0.6 %
BILIRUB SERPL-MCNC: 0.2 MG/DL
BUN SERPL-MCNC: 11 MG/DL
CALCIUM SERPL-MCNC: 9.3 MG/DL
CHLORIDE SERPL-SCNC: 101 MMOL/L
CO2 SERPL-SCNC: 27 MMOL/L
CREAT SERPL-MCNC: 0.77 MG/DL
EGFR: 89 ML/MIN/1.73M2
EOSINOPHIL # BLD AUTO: 0.05 K/UL
EOSINOPHIL NFR BLD AUTO: 1 %
GLUCOSE SERPL-MCNC: 79 MG/DL
HCT VFR BLD CALC: 37.9 %
HGB BLD-MCNC: 12.2 G/DL
IMM GRANULOCYTES NFR BLD AUTO: 0.2 %
LYMPHOCYTES # BLD AUTO: 1.31 K/UL
LYMPHOCYTES NFR BLD AUTO: 25.1 %
MAN DIFF?: NORMAL
MCHC RBC-ENTMCNC: 29.8 PG
MCHC RBC-ENTMCNC: 32.2 G/DL
MCV RBC AUTO: 92.7 FL
MONOCYTES # BLD AUTO: 0.51 K/UL
MONOCYTES NFR BLD AUTO: 9.8 %
NEUTROPHILS # BLD AUTO: 3.3 K/UL
NEUTROPHILS NFR BLD AUTO: 63.3 %
PLATELET # BLD AUTO: 272 K/UL
POTASSIUM SERPL-SCNC: 4.3 MMOL/L
PROT SERPL-MCNC: 6.7 G/DL
RBC # BLD: 4.09 M/UL
RBC # FLD: 12.2 %
SODIUM SERPL-SCNC: 139 MMOL/L
WBC # FLD AUTO: 5.21 K/UL

## 2025-02-18 ENCOUNTER — RX RENEWAL (OUTPATIENT)
Age: 60
End: 2025-02-18

## 2025-07-25 ENCOUNTER — APPOINTMENT (OUTPATIENT)
Dept: RHEUMATOLOGY | Facility: CLINIC | Age: 60
End: 2025-07-25

## 2025-07-29 ENCOUNTER — APPOINTMENT (OUTPATIENT)
Dept: RHEUMATOLOGY | Facility: CLINIC | Age: 60
End: 2025-07-29
Payer: COMMERCIAL

## 2025-07-29 VITALS
BODY MASS INDEX: 30.43 KG/M2 | WEIGHT: 155 LBS | HEART RATE: 68 BPM | OXYGEN SATURATION: 98 % | HEIGHT: 60 IN | SYSTOLIC BLOOD PRESSURE: 122 MMHG | DIASTOLIC BLOOD PRESSURE: 72 MMHG

## 2025-07-29 DIAGNOSIS — M06.09 RHEUMATOID ARTHRITIS W/OUT RHEUMATOID FACTOR, MULTIPLE SITES: ICD-10-CM

## 2025-07-29 PROCEDURE — 99214 OFFICE O/P EST MOD 30 MIN: CPT

## 2025-07-29 PROCEDURE — G2211 COMPLEX E/M VISIT ADD ON: CPT | Mod: NC

## 2025-07-30 LAB
ALBUMIN SERPL ELPH-MCNC: 4.7 G/DL
ALP BLD-CCNC: 70 U/L
ALT SERPL-CCNC: 20 U/L
ANION GAP SERPL CALC-SCNC: 16 MMOL/L
AST SERPL-CCNC: 31 U/L
BASOPHILS # BLD AUTO: 0.02 K/UL
BASOPHILS NFR BLD AUTO: 0.5 %
BILIRUB SERPL-MCNC: 0.4 MG/DL
BUN SERPL-MCNC: 10 MG/DL
CALCIUM SERPL-MCNC: 9.6 MG/DL
CHLORIDE SERPL-SCNC: 100 MMOL/L
CO2 SERPL-SCNC: 26 MMOL/L
CREAT SERPL-MCNC: 0.57 MG/DL
EGFRCR SERPLBLD CKD-EPI 2021: 105 ML/MIN/1.73M2
EOSINOPHIL # BLD AUTO: 0.04 K/UL
EOSINOPHIL NFR BLD AUTO: 1 %
GLUCOSE SERPL-MCNC: 68 MG/DL
HCT VFR BLD CALC: 39.9 %
HGB BLD-MCNC: 12.5 G/DL
IMM GRANULOCYTES NFR BLD AUTO: 0.2 %
LYMPHOCYTES # BLD AUTO: 1.54 K/UL
LYMPHOCYTES NFR BLD AUTO: 36.8 %
MAN DIFF?: NORMAL
MCHC RBC-ENTMCNC: 30.3 PG
MCHC RBC-ENTMCNC: 31.3 G/DL
MCV RBC AUTO: 96.8 FL
MONOCYTES # BLD AUTO: 0.4 K/UL
MONOCYTES NFR BLD AUTO: 9.5 %
NEUTROPHILS # BLD AUTO: 2.18 K/UL
NEUTROPHILS NFR BLD AUTO: 52 %
PLATELET # BLD AUTO: 236 K/UL
POTASSIUM SERPL-SCNC: 4.2 MMOL/L
PROT SERPL-MCNC: 7.1 G/DL
RBC # BLD: 4.12 M/UL
RBC # FLD: 12.8 %
SODIUM SERPL-SCNC: 142 MMOL/L
WBC # FLD AUTO: 4.19 K/UL